# Patient Record
Sex: MALE | Race: OTHER | Employment: OTHER | URBAN - METROPOLITAN AREA
[De-identification: names, ages, dates, MRNs, and addresses within clinical notes are randomized per-mention and may not be internally consistent; named-entity substitution may affect disease eponyms.]

---

## 2017-02-02 ENCOUNTER — OFFICE VISIT (OUTPATIENT)
Dept: FAMILY MEDICINE CLINIC | Age: 42
End: 2017-02-02

## 2017-02-02 VITALS
DIASTOLIC BLOOD PRESSURE: 86 MMHG | WEIGHT: 200 LBS | HEART RATE: 57 BPM | TEMPERATURE: 98 F | SYSTOLIC BLOOD PRESSURE: 133 MMHG | HEIGHT: 69 IN | BODY MASS INDEX: 29.62 KG/M2

## 2017-02-02 DIAGNOSIS — Z23 ENCOUNTER FOR IMMUNIZATION: ICD-10-CM

## 2017-02-02 DIAGNOSIS — L08.9 RIGHT FOOT INFECTION: Primary | ICD-10-CM

## 2017-02-02 RX ORDER — CEPHALEXIN 500 MG/1
500 CAPSULE ORAL 4 TIMES DAILY
Qty: 40 CAP | Refills: 0 | Status: SHIPPED | OUTPATIENT
Start: 2017-02-02 | End: 2017-02-12

## 2017-02-02 NOTE — PATIENT INSTRUCTIONS
Picadura de araña o de escorpión: Instrucciones de cuidado - [ Spider Bite or Scorpion Sting: Care Instructions ]  Instrucciones de cuidado    Las picaduras de arañas o de escorpiones suelen causar inflamación, enrojecimiento, dolor y comezón leves. Estos síntomas leves son comunes y pueden durar desde unas horas hasta varios días. Algunas personas tienen [place] [employer] graves. El tratamiento en el hogar suele ser todo lo que necesita para [employer] síntomas. La atención de seguimiento es santana parte clave de wilkes tratamiento y seguridad. Asegúrese de hacer y acudir a todas las citas, y llame a wilkes médico si está teniendo problemas. También es santana buena idea saber los resultados de los exámenes y mantener santana lista de los medicamentos que shira. ¿Cómo puede cuidarse en el hogar? · Colóquese hielo o santana compresa fría sobre la rick de 10 a 20 minutos cada vez. Póngase un paño mason entre el hielo y la piel. · Pruebe un medicamento de venta lawrence para la comezón, el enrojecimiento, la hinchazón y el dolor. Sondra y siga todas las instrucciones de la [place]. ¨ [place] un antihistamínico, yael difenhidramina (Benadryl) o loratadina (Claritin). ¨ Póngase crema de hidrocortisona al 1% o loción de calamina en la piel. · No se rasque ni se frote la piel alrededor de la rick. ¿Cuándo debe pedir ayuda? Llame al 911 en cualquier momento que considere que necesita atención de emergencia. Por ejemplo, llame si:  · Se desmayó (perdió el conocimiento). · Tiene convulsiones. · Tiene dificultades para respirar. Llame a wilkes médico ahora mismo o busque atención médica inmediata si:  · Tiene señales de infección, tales yael:  ¨ Aumento del dolor, la hinchazón, el enrojecimiento o la temperatura alrededor de la picadura. ¨ Vetas rojizas que salen de la rick. ¨ Pus que supura de la rick. Arabella Mena. · Tiene santana ampolla o llaga en la rick de la picadura, o la rick se pone de color violáceo.   Preste especial atención a los cambios en wilkes courtney y asegúrese de comunicarse con wilkes médico si:  · Tiene dolor o ardor en la rick después de 2 días de tratamiento en el hogar. · Tiene síntomas darcy más de 1 semana. ¿Dónde puede encontrar más información en inglés? Elyse Kodak a http://estefania-jarod.info/. Escriba P595 en la búsqueda para aprender más acerca de \"Picadura de araña o de escorpión: Instrucciones de cuidado - [ Spider Bite or Scorpion Sting: Care Instructions ]. \"  Revisado: 27 li, 2016  Versión del contenido: 11.1  © 7915-9459 Healthwise, Incorporated. Las instrucciones de cuidado fueron adaptadas bajo licencia por [employer] (which disclaims liability or warranty for this information). Si usted tiene [place] afección médica o sobre estas instrucciones, siempre pregunte a wilkes profesional de courtney. [employer] niega toda garantía o responsabilidad por wilkes uso de esta información.

## 2017-02-02 NOTE — PROGRESS NOTES
Assessment/Plan:    Saleem was seen today for foot swelling. Diagnoses and all orders for this visit:    Right foot infection  -     cephALEXin (KEFLEX) 500 mg capsule; Take 1 Cap by mouth four (4) times daily for 10 days. Pt was instructed to elevate his right LE above heart level for 3 days, no work. He states that he can and does not need a work note- it is a [employer] service  Tetanus, flu shot  Close f/up  This is 3 weeks out and by his description, the swelling has improved, the redness has resolved and the pain has improved. Follow-up Disposition:  Return in about 2 weeks (around 2/16/2017). Jeanice Eisenmenger McFerren, PA-C  Saleem Elyse Yi expressed understanding of this plan. An AVS was printed and given to the patient.      ----------------------------------------------------------------------    Chief Complaint   Patient presents with    Foot Swelling     x 3 weeks       History of Present Illness:  47 yo generally healthy has no chronic medical conditions. He is here today due to a possible spider bite on his right foot. The sxs started about 3 weeks ago. He did not see the bite but had acute swelling and redness of his right hallux toe that spread over his whole dorsal foot. The foot has become dark in color and his toe is still painful especially on extension. The skin is sloughing off of his toe. He has not had a fever  He works in the [employer]. He wears work boots but notes that it would be possible for a spider to get into his boots        No past medical history on file. Current Outpatient Prescriptions   Medication Sig Dispense Refill    cephALEXin (KEFLEX) 500 mg capsule Take 1 Cap by mouth four (4) times daily for 10 days. 40 Cap 0       No Known Allergies    Social History   Substance Use Topics    Smoking status: Never Smoker    Smokeless tobacco: None    Alcohol use No       No family history on file.     Physical Exam:     Visit Vitals    BP 133/86 (BP 1 Location: Left arm, BP Patient Position: Sitting)    Pulse (!) 57    Temp 98 °F (36.7 °C) (Oral)    Ht 5' 8.7\" (1.745 m)    Wt 200 lb (90.7 kg)    BMI 29.79 kg/m2     Looks well, pleasant  A&Ox3  WDWN NAD  Respirations normal and non labored  Comparing right to left foot- the right foot has chronic varicose spider veins these are not new. The whole dorsum of his right foot is dark in color as compared to the lighter skin of his left foot. His right hallux has an area of new skin growth where there has been sloughing of old skin. He has tenderness with extension of his right hallux. There is no obvious bite steve but it has been 3 weeks. He has strong, brisk DP pulse. His skin color is cool at the digits on right foot but not cold. There is no swelling present of his right foot (which he states that there initially was).

## 2017-02-02 NOTE — MR AVS SNAPSHOT
Visit Information Melissa Whiteside Personal Médico Departamento Teléfono del Dep. Número de visita 2/2/2017  9:45 AM [address], FRANK Alvarado [phone] Follow-up Instructions Return in about 2 weeks (around 2/16/2017). Upcoming Health Maintenance Date Due DTaP/Tdap/Td series (1 - Tdap) 5/26/1998 INFLUENZA AGE 9 TO ADULT 8/1/2016 Alergias  Review Complete El: 2/2/2017 Por: Ariel Onorfe A partir del:  2/2/2017 No Known Allergies Vacunas actuales Sammy Maillard No hay ninguna vacuna archivada. No revisadas esta visita You Were Diagnosed With   
  
 Zahira Cerda Right foot infection    -  Primary ICD-10-CM: L08.9 ICD-9-CM: 176. 9 Partes vitales PS Pulso Temperatura [place] ( percentil de crecimiento) Peso (percentil de crecimiento) BMI ([facility]  
 133/86 (BP 1 Location: Left arm, BP Patient Position: Sitting) (!) 57 98 °F (36.7 °C) (Oral) 5' 8.7\" (1.745 m) 200 lb (90.7 kg) 29.79 kg/m2 Estatus de tabaquísmo Never Smoker Historial de signos vitales BMI and BSA Data Body Mass Index Body Surface Area  
 29.79 kg/m 2 2.1 m 2 Latonia Burciaga Pharmacy Name Phone [facility] [address] [place] lista de medicamentos actualizada Lista actualizada el: 2/2/17 10:47 AM.  Ellin Viola use santoro lista de medicamentos más reciente. cephALEXin 500 mg capsule También conocido yael:  Cliff De Anda Take 1 Cap by mouth four (4) times daily for 10 days. Recetas Enviado a la Marcia Refills  
 cephALEXin (KEFLEX) 500 mg capsule 0 Sig: Take 1 Cap by mouth four (4) times daily for 10 days. Class: Normal  
 Pharmacy: [address] Ph #: [phone] Route: Oral  
  
Instrucciones de seguimiento Return in about 2 weeks (around 2/16/2017). Instrucciones para el [place] Picadura de araña o de escorpión: Instrucciones de cuidado - [ Spider Bite or Scorpion Sting: Care Instructions ] Instrucciones de cuidado Las picaduras de arañas o de escorpiones suelen causar inflamación, enrojecimiento, dolor y comezón leves. Estos síntomas leves son comunes y pueden durar desde unas horas hasta varios días. Algunas personas tienen [place] [employer] graves. El tratamiento en el hospitals suele ser todo lo que necesita para [employer] síntomas. La atención de seguimiento es santana parte clave de wilkes tratamiento y seguridad. Asegúrese de hacer y acudir a todas las citas, y llame a wilkes médico si está teniendo problemas. También es santana buena idea saber los resultados de los exámenes y mantener santana lista de los medicamentos que shira. Cómo puede cuidarse en el hospitals? · Colóquese hielo o santana compresa fría sobre la rick de 10 a 20 minutos cada vez. Póngase un paño mason entre el hielo y la piel. · Pruebe un medicamento de venta lawrence para la comezón, el enrojecimiento, la hinchazón y el dolor. Sondra y siga todas las instrucciones de la [place]. ¨ [place] un antihistamínico, yael difenhidramina (Benadryl) o loratadina (Claritin). ¨ Póngase crema de hidrocortisona al 1% o loción de calamina en la piel. · No se rasque ni se frote la piel alrededor de la rick. Cuándo debe pedir ayuda? Llame al 911 en cualquier momento que considere que necesita atención de emergencia. Por ejemplo, llame si: 
· Se desmayó (perdió el conocimiento). · Tiene convulsiones. · Tiene dificultades para respirar. Llame a wilkes médico ahora mismo o busque atención médica inmediata si: · [address], tales yael: ¨ Aumento del dolor, la hinchazón, el enrojecimiento o la temperatura alrededor de la picadura. ¨ Vetas rojizas que salen de la rick. ¨ Pus que supura de la rick. Heather Stapbecca. · Tiene santana ampolla o llaga en la rick de la picadura, o la rick se pone de color violáceo. Preste especial atención a los cambios en wilkes courtney y asegúrese de comunicarse con wilkes médico si: · Tiene dolor o ardor en la rick después de 2 días de tratamiento en el hogar. · Tiene síntomas darcy más de 1 semana. Dónde puede encontrar más información en inglés? Susan Chou a http://estefania-jarod.info/. Escriba P595 en la búsqueda para aprender más acerca de \"Picadura de araña o de escorpión: Instrucciones de cuidado - [ Spider Bite or Scorpion Sting: Care Instructions ]. \" 
Revisado: 27 [place], 2016 Versión del contenido: 11.1 © 6087-0237 Healthwise, Incorporated. Las instrucciones de cuidado fueron adaptadas bajo licencia por [employer] (which disclaims liability or warranty for this information). Si usted tiene [place] afección médica o sobre estas instrucciones, siempre pregunte a wilkes profesional de courtney. Healthwise, Incorporated niega toda garantía o responsabilidad por wilkes uso de esta información. Introducing [facility] & HEALTH SERVICES! [facility] introduce portal paciente MyChart . Ahora se puede acceder a partes de wilkes expediente médico, enviar por correo electrónico la oficina de wilkes médico y solicitar renovaciones de medicamentos en línea. En wilkes navegador de Internet , Erasmo López a [email or web address] Amy maykel en el usuario por Rajat Bruno? Keri brar aquí en la sesión Martita Loja. Verá la página de registro [place]. Ingrese wilkes código de [facility] Sejal shannon y yael aparece a continuación. [place] no tendrá que [employer] código después de lisa completado el proceso de registro . Si usted no se inscribe antes de la fecha de caducidad , debe solicitar un nuevo código. · MyChart Código de acceso : [id number] Expires: 5/3/2017 10:47 AM 
 
Ingresa los últimos cuatro dígitos de wilkes Número de Seguro Social ( xxxx ) y fecha de nacimiento ( dd / mm / aaaa ) yael se indica y amy clic en Enviar. Usted será llevado a la siguiente página de registro . Crear un ID MyChart . Esta será wilkes ID de inicio de sesión de MyChart y no puede ser [place] , por lo que pensar en santana que es Madlyn Manners y fácil de recordar . Crear santana contraseña MyChart . Usted puede cambiar wilkes contraseña en cualquier momento . Ingrese wilkes Password Reset de preguntas y [place] . [place] se puede utilizar en un momento posterior si usted olvida wilkes contraseña. Introduzca wilkes dirección de correo electrónico . Debbe Cancel recibirá santana notificación por correo electrónico cuando la nueva información está disponible en MyChart . Ronan Hai clic en Registrarse. Merl Clutter robbi y descargar porciones de wilkes expediente médico. 
Amy clic en el enlace de descarga del menú Resumen para descargar santana copia portátil de wilkes información médica . Si tiene [employer] , por favor visite la sección de preguntas frecuentes del sitio web MyChart . Recuerde, MyChart NO es que se utilizará para las necesidades urgentes. Para emergencias médicas , llame al 911 . Ahora disponible en wilkes iPhone y Android ! Por favor proporcione ryan resumen de la documentación de cuidado a wilkes próximo proveedor. If you have any questions after today's visit, please call [phone].

## 2017-02-02 NOTE — PROGRESS NOTES
Printed AVS, provided to pt and reviewed. Pt indicated understanding and had no questions. Told pt that rx's have been sent to pharmacy and they should be ready for  in approximately 2 hrs. Requests flu and Tdap vaccine; denies fever, egg allergy. Immunization given per protocol and recorded in [address]. VIS information sheet given, explained possible S/E. Reviewed sx indicating need to be seen in ER. Pt had no adverse reaction at time of discharge. All medications reviewed with the pt. The pt was told to elevate his RLE as much as possible higher than his heart for 3 days. He may go back to work after 3 days on Mon. If foot is worse or no better ( increased redness, swelling, pain, fever and chills, drainage) he needed to go to the ED. The  was Dave Brown.  Pari Garner RN

## 2017-02-24 ENCOUNTER — OFFICE VISIT (OUTPATIENT)
Dept: FAMILY MEDICINE CLINIC | Age: 42
End: 2017-02-24

## 2017-02-24 VITALS
TEMPERATURE: 98.2 F | WEIGHT: 201 LBS | BODY MASS INDEX: 29.94 KG/M2 | DIASTOLIC BLOOD PRESSURE: 76 MMHG | HEART RATE: 93 BPM | SYSTOLIC BLOOD PRESSURE: 112 MMHG

## 2017-02-24 DIAGNOSIS — L08.9 RIGHT FOOT INFECTION: Primary | ICD-10-CM

## 2017-02-24 RX ORDER — IBUPROFEN 600 MG/1
600 TABLET ORAL
Qty: 60 TAB | Refills: 0 | Status: SHIPPED | OUTPATIENT
Start: 2017-02-24

## 2017-02-24 NOTE — MR AVS SNAPSHOT
Visit Information Carol Ann Horner Personal Médico Departamento Teléfono del Dep. Número de visita 2/24/2017  2:00 PM Willaim FRANK [place] [address] Davison [phone] Follow-up Instructions Return if symptoms worsen or fail to improve. Upcoming Health Maintenance Date Due DTaP/Tdap/Td series (2 - Td) 2/2/2027 Alerg[place]  Review Complete El: 2/2/2017 Por: Thierry Conakiko A partir del:  2/24/2017 No Known Allergies Vacunas actuales [place] [facility] Ice Influenza Vaccine (Quad) PF 2/2/2017 Tdap 2/2/2017 No revisadas esta visita You Were Diagnosed With   
  
 Salo Claudio Right foot infection    -  Primary ICD-10-CM: L08.9 ICD-9-CM: 681. 9 Partes vitales PS  
  
  
  
  
  
 112/76 (BP 1 Location: Right arm, BP Patient Position: Sitting) Historial de signos vitales BMI and BSA Data Body Mass Index Body Surface Area  
 29.94 kg/m 2 2.1 m 2 Kwasi Rape Pharmacy Name Phone [facility] [address] [place] lista de medicamentos actualizada Lista actualizada el: 2/24/17  2:12 PM.  Alfredozach Jensen use santoro lista de medicamentos más reciente. ibuprofen 600 mg tablet También conocido yael:  MOTRIN Take 1 Tab by mouth every six (6) hours as needed for Pain. Recetas Enviado a la [place] Refills  
 ibuprofen (MOTRIN) 600 mg tablet 0 Sig: Take 1 Tab by mouth every six (6) hours as needed for Pain. Class: Normal  
 Pharmacy: [address] Ph #: [phone] Route: Oral  
  
Instrucciones de seguimiento Return if symptoms worsen or fail to improve. Introducing [facility] & HEALTH SERVICES! [facility] introduce portal paciente MyChart .  Ahora se puede acceder a partes de wilkes expediente médico, enviar por correo electrónico la oficina de wilkes médico y solicitar renovaciones de medicamentos en línea. En wilkes navegador de Internet , Yady espinosa [email or web address] Donaldo clic en el usuario por [place]? Marcimadhavikera [place] clic aquí en la sesión Raudel Roche. Verá la página de registro [place]. Ingrese wilkes código de [facility] of Sejal shannon y yael aparece a continuación. Usted no tendrá que [employer] código después de lisa completado el proceso de registro . Si usted no se inscribe antes de la fecha de caducidad , debe solicitar un nuevo código. · MyChart Código de acceso : [id number] Expires: 5/3/2017 10:47 AM 
 
Ingresa los últimos cuatro dígitos de wilkes Número de Seguro Social ( xxxx ) y fecha de nacimiento ( dd / mm / aaaa ) yael se indica y donaldo clic en Enviar. Usted será llevado a la siguiente página de registro . Crear un ID MyChart . Esta será wilkes ID de inicio de sesión de MyChart y no puede ser [place] , por lo que pensar en santana que es Salena Drones y fácil de recordar . Crear santana contraseña MyChart . Usted puede cambiar wilkes contraseña en cualquier momento . Ingrese wilkes Password Reset de preguntas y [place] . [place] se puede utilizar en un momento posterior si usted olvida wilkes contraseña. Introduzca wilkes dirección de correo electrónico . Raya Lyman recibirá santana notificación por correo electrónico cuando la nueva información está disponible en MyChart . Odella Credit clic en Registrarse. Rusty Mohini robbi y descargar porciones de wilkes expediente médico. 
Donaldo clic en el enlace de descarga del menú Resumen para descargar santana copia portátil de wilkes información médica . Si tiene [employer] , por favor visite la sección de preguntas frecuentes del sitio web MyChart . Recuerde, MyChart NO es que se utilizará para las necesidades urgentes. Para emergencias médicas , llame al 911 . Ahora disponible en wilkes iPhone y Android ! Por favor proporcione ryan resumen de la documentación de cuidado a wilkes próximo proveedor. If you have any questions after today's visit, please call [phone].

## 2017-02-24 NOTE — PROGRESS NOTES
Coordination of Care  1. Have you been to the ER, urgent care clinic since your last visit? Hospitalized since your last visit? No    2. Have you seen or consulted any other health care providers outside of the [address] [place] [address] since your last visit? Include any pap smears or colon screening. No    Medications  Medication Reconciliation Performed: yes  Patient does not know need refills     Learning Assessment Complete?  yes

## 2017-02-24 NOTE — PROGRESS NOTES
Assessment/Plan:  Right foot infection has healed. The color has returned to normal in his foot, the swelling is gone. He continues to have a little pain under medial arch of his foot and for that I have recommended rolling his foot on frozen water bottle. He was instructed to return to clinic or seek ER care for the following: fever, increase in pain, new swelling or other concern    Follow-up Disposition: Not on File    [facility] expressed understanding of this plan. An AVS was printed and given to the patient.      ----------------------------------------------------------------------    Chief Complaint   Patient presents with    Foot Pain     Right foot pain recheck       History of Present Illness:    Here for recheck on his right foot infection. The skin has healed, the swelling is gone, the color has returned to normal in his foot. Residual mild pain right medial instep. Wearing flip flops today so I have suggested that he wear supportive shoes    No past medical history on file. No Known Allergies    Social History   Substance Use Topics    Smoking status: Never Smoker    Smokeless tobacco: None    Alcohol use No       No family history on file.     Physical Exam:     Visit Vitals    /76 (BP 1 Location: Right arm, BP Patient Position: Sitting)    Pulse 93    Temp 98.2 °F (36.8 °C) (Oral)    Wt 201 lb (91.2 kg)    BMI 29.94 kg/m2       A&Ox3  WDWN NAD  Respirations normal and non labored

## 2018-05-01 ENCOUNTER — APPOINTMENT (OUTPATIENT)
Dept: CT IMAGING | Age: 43
End: 2018-05-01
Attending: PHYSICIAN ASSISTANT
Payer: SELF-PAY

## 2018-05-01 ENCOUNTER — HOSPITAL ENCOUNTER (EMERGENCY)
Age: 43
Discharge: HOME | End: 2018-05-01
Attending: EMERGENCY MEDICINE
Payer: SELF-PAY

## 2018-05-01 VITALS
OXYGEN SATURATION: 97 % | HEART RATE: 78 BPM | WEIGHT: 200 LBS | RESPIRATION RATE: 16 BRPM | HEIGHT: 70 IN | BODY MASS INDEX: 28.63 KG/M2 | TEMPERATURE: 98.5 F | SYSTOLIC BLOOD PRESSURE: 139 MMHG | DIASTOLIC BLOOD PRESSURE: 79 MMHG

## 2018-05-01 DIAGNOSIS — S05.01XA ABRASION OF RIGHT CORNEA, INITIAL ENCOUNTER: ICD-10-CM

## 2018-05-01 DIAGNOSIS — S01.81XA FACIAL LACERATION, INITIAL ENCOUNTER: Primary | ICD-10-CM

## 2018-05-01 DIAGNOSIS — S02.2XXA CLOSED FRACTURE OF NASAL BONE, INITIAL ENCOUNTER: ICD-10-CM

## 2018-05-01 LAB — ERYTHROCYTE [SEDIMENTATION RATE] IN BLOOD: 6 MM/HR (ref 0–15)

## 2018-05-01 PROCEDURE — 70450 CT HEAD/BRAIN W/O DYE: CPT

## 2018-05-01 PROCEDURE — 77030031139 HC SUT VCRL2 J&J -A

## 2018-05-01 PROCEDURE — 75810000294 HC INTERM/LAYERED WND RPR

## 2018-05-01 PROCEDURE — 99285 EMERGENCY DEPT VISIT HI MDM: CPT

## 2018-05-01 PROCEDURE — 74011250637 HC RX REV CODE- 250/637: Performed by: PHYSICIAN ASSISTANT

## 2018-05-01 PROCEDURE — 77030002916 HC SUT ETHLN J&J -A

## 2018-05-01 PROCEDURE — 74011250636 HC RX REV CODE- 250/636: Performed by: PHYSICIAN ASSISTANT

## 2018-05-01 PROCEDURE — 74011000250 HC RX REV CODE- 250: Performed by: PHYSICIAN ASSISTANT

## 2018-05-01 PROCEDURE — 90471 IMMUNIZATION ADMIN: CPT

## 2018-05-01 PROCEDURE — 90715 TDAP VACCINE 7 YRS/> IM: CPT | Performed by: PHYSICIAN ASSISTANT

## 2018-05-01 PROCEDURE — 36415 COLL VENOUS BLD VENIPUNCTURE: CPT | Performed by: PHYSICIAN ASSISTANT

## 2018-05-01 PROCEDURE — 77030018836 HC SOL IRR NACL ICUM -A

## 2018-05-01 PROCEDURE — 85652 RBC SED RATE AUTOMATED: CPT | Performed by: PHYSICIAN ASSISTANT

## 2018-05-01 PROCEDURE — 70486 CT MAXILLOFACIAL W/O DYE: CPT

## 2018-05-01 RX ORDER — ERYTHROMYCIN 5 MG/G
OINTMENT OPHTHALMIC
Qty: 1 G | Refills: 0 | Status: SHIPPED | OUTPATIENT
Start: 2018-05-01 | End: 2018-05-08

## 2018-05-01 RX ORDER — TRAMADOL HYDROCHLORIDE 50 MG/1
50 TABLET ORAL
Status: COMPLETED | OUTPATIENT
Start: 2018-05-01 | End: 2018-05-01

## 2018-05-01 RX ORDER — TETRACAINE HYDROCHLORIDE 5 MG/ML
1 SOLUTION OPHTHALMIC
Status: COMPLETED | OUTPATIENT
Start: 2018-05-01 | End: 2018-05-01

## 2018-05-01 RX ORDER — BUTALBITAL, ACETAMINOPHEN AND CAFFEINE 300; 40; 50 MG/1; MG/1; MG/1
1 CAPSULE ORAL
Qty: 20 CAP | Refills: 0 | Status: SHIPPED | OUTPATIENT
Start: 2018-05-01

## 2018-05-01 RX ADMIN — TETRACAINE HYDROCHLORIDE 1 DROP: 5 SOLUTION OPHTHALMIC at 17:45

## 2018-05-01 RX ADMIN — TETANUS TOXOID, REDUCED DIPHTHERIA TOXOID AND ACELLULAR PERTUSSIS VACCINE, ADSORBED 0.5 ML: 5; 2.5; 8; 8; 2.5 SUSPENSION INTRAMUSCULAR at 17:46

## 2018-05-01 RX ADMIN — Medication 2 ML: at 16:57

## 2018-05-01 RX ADMIN — TRAMADOL HYDROCHLORIDE 50 MG: 50 TABLET, FILM COATED ORAL at 18:52

## 2018-05-01 RX ADMIN — FLUORESCEIN SODIUM 2 STRIP: 0.6 STRIP OPHTHALMIC at 16:20

## 2018-05-01 NOTE — ED PROVIDER NOTES
HPI Comments: Bonifacio Cooper is a 38 y.o. male  who presents by private vehicle to ER with c/o Patient presents with:  Head Injury  Laceration  Patient was working with a bobcat, pulling a log with a strap when the strap broke flew back hitting him in the face. Patient with laceration to right side of forehead, patient also with superficial laceration to bridge of nose and erythema to right eye. Patient denies LOC, nausea or vomiting. Denies neck or back pain. Patient reports vision in right eye is blurry. He specifically denies any fevers, chills, nausea, vomiting, chest pain, shortness of breath, headache, rash, diarrhea, abdominal pain, urinary/bowel changes, sweating or weight loss. PCP: Andrew Batista MD   PMHx significant for: No past medical history on file. PSHx significant for: No past surgical history on file. Social Hx: Tobacco use: Smoking status: Not on file                        Smokeless status: Not on file                     ; EtOH use: The patient states he drinks 0 per week.; Illicit Drug use: Allergies: Allergies not on file    There are no other complaints, changes or physical findings at this time. Patient is a 38 y.o. male presenting with head injury and skin laceration. The history is provided by the patient. Head Injury    The incident occurred 1 to 2 hours ago. He came to the ER via walk-in. The injury mechanism was a direct blow and a laceration. The volume of blood lost was minimal. The quality of the pain is described as sharp. The pain is at a severity of 6/10. The pain is mild. The pain has been constant since the injury. Associated symptoms include blurred vision. Pertinent negatives include no vomiting, no tinnitus, no disorientation, no weakness and no memory loss. He has tried applying pressure and NSAID for the symptoms. There was no loss of consciousness. He has been behaving normally. It is unknown when the patient last had a tetanus shot.    Laceration Pertinent negatives include no weakness. No past medical history on file. No past surgical history on file. No family history on file. Social History     Social History    Marital status:      Spouse name: N/A    Number of children: N/A    Years of education: N/A     Occupational History    Not on file. Social History Main Topics    Smoking status: Not on file    Smokeless tobacco: Not on file    Alcohol use Not on file    Drug use: Not on file    Sexual activity: Not on file     Other Topics Concern    Not on file     Social History Narrative         ALLERGIES: Review of patient's allergies indicates not on file. Review of Systems   Constitutional: Negative. HENT: Negative. Negative for tinnitus. Eyes: Positive for blurred vision and pain. Respiratory: Negative. Cardiovascular: Negative. Gastrointestinal: Negative. Negative for vomiting. Endocrine: Negative. Genitourinary: Negative. Musculoskeletal: Negative. Skin: Positive for wound. Allergic/Immunologic: Negative. Neurological: Negative. Negative for weakness. Hematological: Negative. Psychiatric/Behavioral: Negative. Negative for memory loss. All other systems reviewed and are negative. Vitals:    05/01/18 1605   BP: 130/79   Pulse: 78   Resp: 16   Temp: 98.5 °F (36.9 °C)   SpO2: 99%   Weight: 90.7 kg (200 lb)   Height: 5' 10\" (1.778 m)            Physical Exam   Constitutional: He is oriented to person, place, and time. He appears well-developed and well-nourished. Non-toxic appearance. He does not have a sickly appearance. He does not appear ill. No distress. HENT:   Head: Normocephalic. Right Ear: External ear normal.   Left Ear: External ear normal.   Nose:       Mouth/Throat: Oropharynx is clear and moist. No oropharyngeal exudate. Eyes: EOM and lids are normal. Pupils are equal, round, and reactive to light. Right eye exhibits no discharge.  Left eye exhibits no discharge. Right conjunctiva is injected. Right conjunctiva has no hemorrhage. Left conjunctiva is not injected. Left conjunctiva has no hemorrhage. No scleral icterus. Right eye exhibits normal extraocular motion and no nystagmus. Left eye exhibits normal extraocular motion and no nystagmus. Right pupil is round and reactive. Left pupil is round and reactive. Pupils are equal.   Slit lamp exam:       The right eye shows corneal abrasion and fluorescein uptake. The right eye shows no corneal flare, no corneal ulcer, no foreign body, no hyphema, no hypopyon and no anterior chamber bulge. Neck: Normal range of motion. Neck supple. No tracheal deviation present. No thyromegaly present. Cardiovascular: Normal rate, regular rhythm, normal heart sounds and intact distal pulses. No murmur heard. Pulmonary/Chest: Effort normal and breath sounds normal. No respiratory distress. He has no wheezes. He has no rales. Abdominal: Soft. Bowel sounds are normal. He exhibits no distension. There is no tenderness. There is no rebound and no guarding. Musculoskeletal: Normal range of motion. He exhibits no edema or tenderness. Spine palpated with out step off or crepitus. Non-TTP over spine. Full ROM to all extremities. All extremities are NVI. Patient ambulatory to exam room with out difficulty. Lymphadenopathy:     He has no cervical adenopathy. Neurological: He is alert and oriented to person, place, and time. He has normal strength. No cranial nerve deficit or sensory deficit. He displays a negative Romberg sign. Coordination normal. GCS eye subscore is 4. GCS verbal subscore is 5. GCS motor subscore is 6. Skin: Skin is warm. No rash noted. No erythema. Psychiatric: He has a normal mood and affect. His behavior is normal. Judgment and thought content normal.   Nursing note and vitals reviewed.        MDM  Number of Diagnoses or Management Options  Abrasion of right cornea, initial encounter: Closed fracture of nasal bone, initial encounter:   Facial laceration, initial encounter:   Diagnosis management comments: Assesment/Plan- 38 y.o. Patient presents with:  Head Injury  Laceration  differential includes: head injury, traumatic brain injury, laceration, nasal fracture. Labs and imaging reviewed with normal head ct. Ct scan of facial bone showing nasal fracture . Recommend ophthalmology, PCP follow up. Patient educated on reasons to return to the ED. Amount and/or Complexity of Data Reviewed  Clinical lab tests: reviewed and ordered  Tests in the radiology section of CPT®: ordered and reviewed  Tests in the medicine section of CPT®: ordered and reviewed  Discuss the patient with other providers: yes (Attending- Dr. Luis Enrique Lyles who also saw patient and agrees with plan)          ED Course       Wound Repair  Date/Time: 5/1/2018 7:27 PM  Performed by: [employer] provider: Sravan  Preparation: skin prepped with Shur-Clens  Pre-procedure re-eval: Immediately prior to the procedure, the patient was reevaluated and found suitable for the planned procedure and any planned medications. Time out: Immediately prior to the procedure a time out was called to verify the correct patient, procedure, equipment, staff and marking as appropriate. .  Location details: face  Wound length:2.6 - 7.5 cm  Anesthesia: local infiltration    Anesthesia:  Local Anesthetic: LET (lido,epi,tetracaine)  Anesthetic total: 2 mL  Foreign bodies: no foreign bodies  Irrigation solution: saline  Irrigation method: syringe  Debridement: none  Skin closure: 6-0 nylon  Subcutaneous closure: Vicryl  Number of sutures: 17  Technique: simple  Approximation: close  Patient tolerance: Patient tolerated the procedure well with no immediate complications  My total time at bedside, performing this procedure was 31-45 minutes.

## 2018-05-01 NOTE — ED TRIAGE NOTES
\"I was working on a bobcat and I was pulling a log with a strap on it. The strap broke and it flew up and hit me in the face. It had a piece of metal on it. \"  Patient has deep laceration to right forehead which is covered in gauze on arrival, bleeding controlled. Small superficial laceration to nasal bridge. Patient denies LOC. He says he thinks his vision is intact, +swelling around right eye.

## 2018-05-01 NOTE — DISCHARGE INSTRUCTIONS
Mejia: Instrucciones de cuidado - [ Cuts: Care Instructions ]  Instrucciones de cuidado  Un hoang puede ocurrir en cualquier parte del cuerpo. A veces, se usan puntos de sutura, grapas, vozero para la piel o cintas Jaama 45 llamadas Steri-Strips para mantener juntos los bordes de un hoang y ayudar a que sane. Las cintas Steri-Strip pueden usarse por sí solas o junto con puntos de sutura o grapas. Otras veces, se willa los mejia abiertos. Si el hoang es profundo y [employer], es posible que el médico haya cerrado el hoang en dos capas. Santana capa más profunda de puntos de sutura junta la parte profunda del hoang. Estos puntos se disuelven y no es necesario extraerlos. El cierre de la capa superior, que puede [place] por medio de puntos, Robert, Steri-Strips o vozero, es lo que se puede robbi sobre el hoang. Con frecuencia, un hoang se cubre con santana venda. El médico lo ha examinado minuciosamente, john pueden presentarse problemas más tarde. Si nota algún problema o nuevos síntomas, busque tratamiento médico de inmediato. La atención de seguimiento es santana parte clave de santoro tratamiento y seguridad. Asegúrese de hacer y acudir a todas las citas, y llame a santoro médico si está teniendo problemas. También es santana buena idea saber los resultados de los exámenes y mantener santana lista de los medicamentos que shira. ¿Cómo puede cuidarse en el hogar? Si un hoang está abierto o cerrado  · Apoye la rick afectada sobre santana almohada en cualquier momento que esté sentado o acostado darcy los 3 días siguientes. Trate de mantenerla por encima del nivel del corazón. Jessie ayudará a reducir la hinchazón. · Mantenga la herida seca darcy las primeras 24 a 48 horas. Después de [employer], puede ducharse si el médico lo [place]. Seque el hoang con toques suaves de toalla. · No remoje el hoang, yael en santana vicente (bañera). Santoro médico le indicará cuándo es seguro mojar el hoang.   · Después de las primeras 24 a 48 horas, limpie el hoang con agua y jabón 2 veces al día, a menos que el médico le dé indicaciones diferentes. ¨ No use peróxido de hidrógeno (agua [place]) ni alcohol, los cuales pueden retrasar la sanación. ¨ Puede cubrir el hoang con santana capa delgada de vaselina y santana venda antiadherente. ¨ Si el médico colocó santana venda sobre el hoang, colóquese santana venda nueva después de limpiar el hoang o si la venda se moja o se ensucia. · Evite cualquier actividad que pudiera hacer que el hoang se jose de nuevo. · Sea gutierrez con los medicamentos. Sondra y siga todas las indicaciones de la [place]. ¨ Si el médico le recetó un analgésico (medicamento para el dolor), tómelo según las indicaciones. ¨ Si no está tomando un analgésico recetado, pregúntele a wilkes médico si puede brian carlitos de [employer]. Si se cerró el hoang con puntos, grapas o Steri-Strips  · Siga las instrucciones anteriores para los mejia abiertos o cerrados. · No se quite los puntos o las grapas por sí mismo. El médico le indicará cuándo debe volver para que le quiten los [address] grapas. · Deje puestas las Steri-Strips hasta que se desprendan por sí solas. Si se cerró el hoang con un adhesivo para la piel  · Siga las instrucciones anteriores para los mejia abiertos o cerrados. · Déjese puesto el [employer] sobre la piel hasta que se desprenda por sí solo. [place] puede tardar entre 5 y 7 días. · No se rasque, frote ni hurgue el [employer]. · No se aplique la parte pegajosa de santana venda directamente sobre el [employer]. · No se aplique ningún tipo de pomada, crema o loción sobre la rick. [place] puede hacer que el [employer] se desprenda demasiado pronto. No use peróxido de hidrógeno (agua [place]) ni alcohol, los cuales pueden retrasar la sanación. ¿Cuándo debes pedir ayuda? Llama a tu médico ahora mismo o busca atención médica inmediata si:  ? · Tienes dolor nuevo, o el dolor [employer]. ? · La piel cerca del hoang está fría o pálida, o cambia de color. ? · Sientes hormigueo, debilitamiento o entumecimiento cerca del hoang. ? · El hoang comienza a sangrar, y la donald empapa la venda. Es normal que salga santana pequeña cantidad de [place]. ? · Tienes dificultades para  la rick cercana al hoang. ? · Tienes síntomas de infección, tales yael:  ¨ Aumento del dolor, la hinchazón, el enrojecimiento o la temperatura alrededor del hoang. ¨ Vetas rojizas que comienzan en el hoang. ¨ Pus que sale del hoang. Parker Diana. ?Presta especial atención a los cambios en tu courtney y asegúrate de comunicarte con tu médico si:  ? · El hoang vuelve a abrirse. ? · No mejoras yael se esperaba. ¿Dónde puede encontrar más información en inglés? Nicolas Koch a http://estefania-jarod.info/. Alexandra Mayo [id number] en la búsqueda para aprender más acerca de \"[place]m: Instrucciones de cuidado - [ Cuts: Care Instructions ]. \"  Revisado: 20 Ayala Francois 2017  Versión del contenido: 11.4  © 8974-5053 Healthwise, Incorporated. Las instrucciones de cuidado fueron adaptadas bajo licencia por [employer] (which disclaims liability or warranty for this information). Si usted tiene [place] afección médica o sobre estas instrucciones, siempre pregunte a wilkes profesional de courtney. Healthwise, Incorporated niega toda garantía o responsabilidad por wilkes uso de esta información. Andrea Lorenzo de nariz: Instrucciones de cuidado - [ Broken Nose: Care Instructions ]  Instrucciones de [address] o fractura de la nariz es la ruptura del hueso o Corinne S[place]. La mayoría de las fracturas de la nariz solo necesitan cuidado en el hogar y Tut[place] mare de seguimiento con un médico. La hinchazón debería reducirse en pocos días. Los moretones alrededor [employer] ojos y la nariz deberían desaparecer en 2 a 3 semanas. Usted sanará mejor si cuida kb de sí mismo. Coma santana variedad de alimentos saludables y no fume. La atención de seguimiento es santana parte clave de wilkes tratamiento y seguridad. Asegúrese de hacer y acudir a todas las citas, y llame a wilkes médico si está teniendo problemas. También es santana buena idea saber los resultados de los exámenes y mantener santana lista de los medicamentos que shira. ¿Cómo puede cuidarse en el hogar? · Si tiene santana tablilla (férula) o relleno nasal, déjela en wilkes lugar hasta que wilkes médico la retire. · Si wilkes médico le recetó antibióticos, tómelos según las indicaciones. No deje de tomarlos por el hecho de sentirse mejor. Debe brian todos los antibióticos hasta terminarlos. · [place] descongestionantes yael se le haya indicado para ayudarle a respirar después de que le retiren la tablilla o el relleno nasal. Wilkes médico puede darle santana receta o sugerirle un medicamento de [place]. · Sea gutierrez con los medicamentos. [place] los analgésicos (medicamentos para el dolor) exactamente según las indicaciones. ¨ Si el médico le recetó un analgésico, tómelo según las indicaciones. ¨ Si no está tomando un analgésico recetado, pregúntele a wilkes médico si puede brian carlitos de [place]. · Aplíquese hielo o santana compresa fría en la nariz darcy 10 a 20 minutos cada vez. Trate de hacerlo cada 1 a 2 horas darcy los 3 días siguientes (cuando esté despierto) o hasta que la hinchazón baje. Póngase un paño mason entre el hielo y la piel. · Duerma con la jose angel un poco elevada hasta que la hinchazón se reduzca. Eleve la jose angel y los hombros sobre almohadas. · No practique deportes de contacto darcy 6 semanas. ¿Cuándo debe pedir ayuda? Llame a wilkes médico ahora mismo o busque atención médica inmediata si:  ? · Tiene fiebre o dolor de jose angel intenso. ? · Tiene santana hemorragia nasal que no se detiene después de [employer] fosas nasales con los dedos 3 veces darcy 10 minutos cada vez (30 minutos en total). ? · La donald fluye hacia wilkes garganta aun después de presionar la nariz para cerrarla.    ? · Tiene signos de infección, tales yael:  ¨ Aumento del dolor, la hinchazón, el enrojecimiento o la temperatura. ¨ Vetas rojizas que salen de la herida. ¨ Pus que supura de la nariz. Luis Debar. ? · Tiene náuseas y vómito, se siente confuso o tiene dificultades para permanecer despierto. ?Preste especial atención a los cambios en wilkes courtney y asegúrese de comunicarse con wilkes médico si:  ? · Le duele la nariz aun después de brian analgésicos. ? · No puede respirar por la nariz después de que la hinchazón se reduce. ? · No mejora yael se esperaba. ¿Dónde puede encontrar más información en inglés? Nancy End a http://estefania-jarod.info/. Escriba [id number] en la búsqueda para aprender más acerca de \"Fractura de nariz: Instrucciones de cuidado - [ Broken Nose: Care Instructions ]. \"  Revisado: 21 marzo, 2017  Versión del contenido: 11.4  © 9045-0424 Healthwise, Incorporated. Las instrucciones de cuidado fueron adaptadas bajo licencia por [employer] (which disclaims liability or warranty for this information). Si usted tiene [place] afección médica o sobre estas instrucciones, siempre pregunte a wilkes profesional de courtney. Healthwise, Incorporated niega toda garantía o responsabilidad por wilkes uso de esta información. We hope that we have addressed all of your medical concerns. The examination and treatment you received in the Emergency Department were for an emergent problem and were not intended as complete care. It is important that you follow up with your healthcare provider(s) for ongoing care. If your symptoms worsen or do not improve as expected, and you are unable to reach your usual health care provider(s), you should return to the Emergency Department. Today's healthcare is undergoing tremendous change, and patient satisfaction surveys are one of the many tools to assess the quality of medical care. You may receive a survey from the [employer] organization regarding your experience in the Emergency Department.   I hope that your experience has been completely positive, particularly the medical care that I provided. As such, please participate in the survey; anything less than excellent does not meet my expectations or intentions. [facility] and [facility] participate in nationally recognized quality of care measures. If your blood pressure is greater than 120/80, as reported below, we urge that you seek medical care to address the potential of high blood pressure, commonly known as hypertension. Hypertension can be hereditary or can be caused by certain medical conditions, pain, stress, or \"white coat syndrome. \"       Please make an appointment with your health care provider(s) for follow up of your Emergency Department visit. VITALS:   Patient Vitals for the past 8 hrs:   Temp Pulse Resp BP SpO2   05/01/18 1856 - - - - 97 %   05/01/18 1845 - - - 139/79 96 %   05/01/18 1834 - - - - 96 %   05/01/18 1828 - - - - 95 %   05/01/18 1815 - - - 138/80 94 %   05/01/18 1800 - - - 143/81 95 %   05/01/18 1759 - - - - 92 %   05/01/18 1745 - - - 135/67 94 %   05/01/18 1730 - - - 140/71 92 %   05/01/18 1605 98.5 °F (36.9 °C) 78 16 130/79 99 %          Thank you for allowing us to provide you with medical care today. We realize that you have many choices for your emergency care needs. Please choose us in the future for any continued health care needs. Wallene Leyden Trice, [facility].   Office: [phone]            Recent Results (from the past 24 hour(s))   SED RATE (ESR)    Collection Time: 05/01/18  4:48 PM   Result Value Ref Range    Sed rate, automated 6 0 - 15 mm/hr       Ct Head Wo Cont    Result Date: 5/1/2018  EXAM:  CT HEAD WO CONT INDICATION:   head injury COMPARISON: None. CONTRAST:  None. TECHNIQUE: Unenhanced CT of the head was performed using 5 mm images. Brain and bone windows were generated.   CT dose reduction was achieved through use of a standardized protocol tailored for this examination and automatic exposure control for dose modulation. FINDINGS: The ventricles and sulci are normal in size, shape and configuration and midline. There is no significant white matter disease. There is no intracranial hemorrhage, extra-axial collection, mass, mass effect or midline shift. The basilar cisterns are open. No acute infarct is identified. The bone windows demonstrate no abnormalities. The visualized portions of the paranasal sinuses and mastoid air cells are clear. There is a laceration overlying right supraorbital region. .     IMPRESSION: No acute intracranial process is identified. Ct Maxillofacial Wo Cont    Result Date: 5/1/2018  EXAM:  CT MAXILLOFACIAL WO CONT INDICATION:   facial pain COMPARISON:  None. CONTRAST:   None. TECHNIQUE:  Multislice helical CT of the facial bones was performed in the axial plane without intravenous contrast administration. Coronal and sagittal reformations were generated. CT dose reduction was achieved through use of a standardized protocol tailored for this examination and automatic exposure control for dose modulation. FINDINGS: There is a right nasal bone fracture with adjacent soft tissue swelling. No laceration with soft tissue swelling right supraorbital region. . There is slight mucosal thickening left maxillary sinus and left frontal sinus. . The globes, optic nerves and extraocular muscles are normal. No abnormalities are identified within the visualized portions of the brain or nasopharynx. IMPRESSION: There is a fracture of the right nasal bone with adjacent soft tissue swelling.

## 2018-05-09 ENCOUNTER — HOSPITAL ENCOUNTER (EMERGENCY)
Age: 43
Discharge: HOME | End: 2018-05-09
Attending: EMERGENCY MEDICINE
Payer: SELF-PAY

## 2018-05-09 VITALS
OXYGEN SATURATION: 100 % | HEART RATE: 71 BPM | RESPIRATION RATE: 18 BRPM | DIASTOLIC BLOOD PRESSURE: 81 MMHG | TEMPERATURE: 98.4 F | WEIGHT: 200 LBS | SYSTOLIC BLOOD PRESSURE: 144 MMHG | BODY MASS INDEX: 29.62 KG/M2 | HEIGHT: 69 IN

## 2018-05-09 DIAGNOSIS — Z48.02 VISIT FOR SUTURE REMOVAL: Primary | ICD-10-CM

## 2018-05-09 PROCEDURE — 75810000275 HC EMERGENCY DEPT VISIT NO LEVEL OF CARE

## 2018-05-09 NOTE — DISCHARGE INSTRUCTIONS
Aprenda acerca de la retirada de puntos de sutura y grapas - [ Peter Jacinto and Alexandria Removal ]  ¿Cuándo se retiran los puntos de sutura y las grapas? Wilkes médico le indicará cuándo deben [employer] puntos de sutura o las grapas, por lo general al cabo de 7 a 14 sukhwinder. El tiempo que tenga que esperar dependerá de cosas yael la ubicación de la herida, el tamaño y la profundidad de la herida y wilkes estado de courtney general. No se quite los puntos de sutura usted mismo. Los puntos en la ella se suelen retirar al cabo de santana semana. Maris los puntos y las grapas en otras zonas del cuerpo, yael en la espalda o el abdomen o sobre santana articulación, podrían tener que permanecer en wilkes lugar por más [place], frecuentemente santana o [place]. Asegúrese de seguir las instrucciones de wilkes médico.  ¿Cómo se retiran los puntos de sutura y las grapas? Generalmente no duele cuando el médico retira los puntos de sutura o las grapas. Usted puede sentir un pequeño tirón cuando se saca cada punto o grapa. · Estará sentado o recostado. · Para retirar los puntos de sutura, el médico utilizará tijeras para cortar cada carlitos de los nudos y [employer] hilos. · Para retirar las grapas, el médico usará un instrumento para sacar las grapas santana por [place]. · La rick puede estar sensible después de lisa retirado los puntos de sutura o las grapas. Maris debería sentirse mejor al cabo de unos minutos o después de algunas horas. ¿Qué puede esperar después de que le retiren los puntos de sutura o las grapas? Dependiendo del tipo de hoang y de wilkes ubicación, usted tendrá santana cicatriz. Las cicatrices por lo general son menos visibles con el tiempo. Mantenga limpia la rick, maris no se necesita santana venda. ¿Cuándo debe pedir ayuda? Llame a wilkes médico ahora mismo o busque atención médica inmediata si:  · Siente un dolor nuevo o el dolor empeora. · Tiene dificultades para  la rick que rodea la cicatriz.   · Tiene síntomas de infección, tales yael:  ¨ Mayor dolor, hinchazón, enrojecimiento o aumento de la temperatura alrededor de la cicatriz. ¨ Vetas rojizas que salen de la cicatriz. ¨ Pus que sale de la cicatriz. Corey Dolphin. Preste especial atención a los cambios en wilkes courtney y asegúrese de comunicarse con wilkes médico si:  · Se le abre la cicatriz. · No mejora yael se esperaba. La atención de seguimiento es santana parte clave de wilkes tratamiento y seguridad. Asegúrese de hacer y acudir a todas las citas, y llame a wilkes médico si está teniendo problemas. También es santana buena idea mantener santana lista de los medicamentos que shira. ¿Dónde puede encontrar más información en inglés? Osei Merritt a http://estefania-jarod.info/. Jaycee Ivan J531 en la búsqueda para aprender más acerca de \"Aprenda acerca de la retirada de puntos de sutura y grapas - [ Learning About Stitches and Staples Removal ]. \"  Revisado: 20 Obey Ingram 2017  Versión del contenido: 11.4  © 7596-8249 [employer]wise, Incorporated. Las instrucciones de cuidado fueron adaptadas bajo licencia por [employer] (which disclaims liability or warranty for this information). Si usted tiene [place] afección médica o sobre estas instrucciones, siempre pregunte a wilkes profesional de courtney. Healthwise, Incorporated niega toda garantía o responsabilidad por wilkes uso de esta información.

## 2018-05-09 NOTE — ED PROVIDER NOTES
HPI Comments: 38 y.o. male with no significant past medical history who presents ambulatory to the ED with cc of suture removal. Pt states he was evaluated here last week with a laceration over his right eyebrow sustained during a work accident when the strap of a Bobcat broke while he was pulling a log. He states had a laceration repair at the time and is back as instructed after 7 days for a suture removal. Pt specifically denies any complications, drainage, fevers, or chills. There are no other acute medical concerns at this time. Social Hx: denies Tobacco use  PCP: None    Note written by Cachorro Ng, as dictated by Lavern Reinoso. Prerna Chen MD 7:15 AM    The history is provided by the patient. No  was used. No past medical history on file. No past surgical history on file. No family history on file. Social History     Social History    Marital status:      Spouse name: N/A    Number of children: N/A    Years of education: N/A     Occupational History    Not on file. Social History Main Topics    Smoking status: Never Smoker    Smokeless tobacco: Not on file    Alcohol use No    Drug use: No    Sexual activity: Not on file     Other Topics Concern    Not on file     Social History Narrative         ALLERGIES: Review of patient's allergies indicates no known allergies. Review of Systems   Constitutional: Negative for chills and fever. HENT: Negative for ear pain and sore throat. Eyes: Negative for pain. Respiratory: Negative for chest tightness and shortness of breath. Cardiovascular: Negative for chest pain and leg swelling. Gastrointestinal: Negative for abdominal pain, nausea and vomiting. Genitourinary: Negative for dysuria and flank pain. Musculoskeletal: Negative for back pain. Skin: Positive for wound (with sutures). Negative for rash. Neurological: Negative for headaches.    All other systems reviewed and are negative. Vitals:    05/09/18 0659   BP: 144/81   Pulse: 71   Resp: 18   Temp: 98.4 °F (36.9 °C)   SpO2: 100%   Weight: 90.7 kg (200 lb)   Height: 5' 9\" (1.753 m)            Physical Exam   Constitutional: He appears well-developed and well-nourished. No distress. HENT:   Head: Normocephalic and atraumatic. Eyes: Pupils are equal, round, and reactive to light. No scleral icterus. Neck: Normal range of motion. Neck supple. No tracheal deviation present. Cardiovascular: Normal rate, regular rhythm, normal heart sounds and intact distal pulses. Exam reveals no gallop and no friction rub. No murmur heard. Pulmonary/Chest: Effort normal and breath sounds normal. No respiratory distress. He has no wheezes. He has no rales. Abdominal: Soft. He exhibits no distension. There is no tenderness. There is no rebound and no guarding. Musculoskeletal: He exhibits no edema. Neurological: He is alert. Skin: Skin is warm, dry and intact. Laceration to R eyebrow with 11 sutures in place; skin intact without purulence or erythema   Psychiatric: He has a normal mood and affect. Nursing note and vitals reviewed. Note written by Cachorro Alonso, as dictated by Tomas Sharif. Gabriel Harris MD 7:15 AM      [facility]      ED Course       Suture/Staple Removal  Date/Time: 5/9/2018 5:32 PM  Performed by: Ladonna Reyez Authorized by: Ladonna Reyez     Consent:     Consent obtained:  Verbal    Consent given by:  Patient    Alternatives discussed:  No treatment  Location:     Location:  [address] [facility] location:  Eyebrow    Eyebrow location:  R eyebrow  Procedure details:     Wound appearance:  No signs of infection, good wound healing and clean    Number of sutures removed:  11  Post-procedure details:     Post-removal:  No dressing applied    Patient tolerance of procedure:   Tolerated well, no immediate complications

## 2025-01-13 ENCOUNTER — HOSPITAL ENCOUNTER (EMERGENCY)
Facility: HOSPITAL | Age: 50
Discharge: HOME | End: 2025-01-13
Attending: EMERGENCY MEDICINE

## 2025-01-13 ENCOUNTER — APPOINTMENT (OUTPATIENT)
Facility: HOSPITAL | Age: 50
End: 2025-01-13

## 2025-01-13 VITALS
SYSTOLIC BLOOD PRESSURE: 128 MMHG | RESPIRATION RATE: 16 BRPM | HEART RATE: 67 BPM | BODY MASS INDEX: 31.1 KG/M2 | TEMPERATURE: 97.9 F | OXYGEN SATURATION: 96 % | WEIGHT: 210 LBS | DIASTOLIC BLOOD PRESSURE: 93 MMHG | HEIGHT: 69 IN

## 2025-01-13 DIAGNOSIS — K40.90 INGUINAL HERNIA, RIGHT: Primary | ICD-10-CM

## 2025-01-13 LAB
ALBUMIN SERPL-MCNC: 3.6 G/DL (ref 3.5–5)
ALBUMIN/GLOB SERPL: 0.9 (ref 1.1–2.2)
ALP SERPL-CCNC: 129 U/L (ref 45–117)
ALT SERPL-CCNC: 29 U/L (ref 12–78)
ANION GAP SERPL CALC-SCNC: 3 MMOL/L (ref 2–12)
AST SERPL-CCNC: 17 U/L (ref 15–37)
BASOPHILS # BLD: 0.08 K/UL (ref 0–0.1)
BASOPHILS NFR BLD: 1.1 % (ref 0–1)
BILIRUB SERPL-MCNC: 0.5 MG/DL (ref 0.2–1)
BUN SERPL-MCNC: 12 MG/DL (ref 6–20)
BUN/CREAT SERPL: 13 (ref 12–20)
CALCIUM SERPL-MCNC: 8.7 MG/DL (ref 8.5–10.1)
CHLORIDE SERPL-SCNC: 108 MMOL/L (ref 97–108)
CO2 SERPL-SCNC: 27 MMOL/L (ref 21–32)
CREAT SERPL-MCNC: 0.92 MG/DL (ref 0.7–1.3)
DIFFERENTIAL METHOD BLD: ABNORMAL
EOSINOPHIL # BLD: 0.54 K/UL (ref 0–0.4)
EOSINOPHIL NFR BLD: 7.1 % (ref 0–7)
ERYTHROCYTE [DISTWIDTH] IN BLOOD BY AUTOMATED COUNT: 13.6 % (ref 11.5–14.5)
GLOBULIN SER CALC-MCNC: 3.9 G/DL (ref 2–4)
GLUCOSE SERPL-MCNC: 109 MG/DL (ref 65–100)
HCT VFR BLD AUTO: 43.5 % (ref 36.6–50.3)
HGB BLD-MCNC: 14.5 G/DL (ref 12.1–17)
IMM GRANULOCYTES # BLD AUTO: 0.02 K/UL (ref 0–0.04)
IMM GRANULOCYTES NFR BLD AUTO: 0.3 % (ref 0–0.5)
LIPASE SERPL-CCNC: 32 U/L (ref 13–75)
LYMPHOCYTES # BLD: 2.81 K/UL (ref 0.8–3.5)
LYMPHOCYTES NFR BLD: 37 % (ref 12–49)
MCH RBC QN AUTO: 27.7 PG (ref 26–34)
MCHC RBC AUTO-ENTMCNC: 33.3 G/DL (ref 30–36.5)
MCV RBC AUTO: 83 FL (ref 80–99)
MONOCYTES # BLD: 0.43 K/UL (ref 0–1)
MONOCYTES NFR BLD: 5.7 % (ref 5–13)
NEUTS SEG # BLD: 3.71 K/UL (ref 1.8–8)
NEUTS SEG NFR BLD: 48.8 % (ref 32–75)
NRBC # BLD: 0 K/UL (ref 0–0.01)
NRBC BLD-RTO: 0 PER 100 WBC
PLATELET # BLD AUTO: 261 K/UL (ref 150–400)
PMV BLD AUTO: 9.9 FL (ref 8.9–12.9)
POTASSIUM SERPL-SCNC: 4 MMOL/L (ref 3.5–5.1)
PROT SERPL-MCNC: 7.5 G/DL (ref 6.4–8.2)
RBC # BLD AUTO: 5.24 M/UL (ref 4.1–5.7)
SODIUM SERPL-SCNC: 138 MMOL/L (ref 136–145)
WBC # BLD AUTO: 7.6 K/UL (ref 4.1–11.1)

## 2025-01-13 PROCEDURE — 83690 ASSAY OF LIPASE: CPT

## 2025-01-13 PROCEDURE — 94761 N-INVAS EAR/PLS OXIMETRY MLT: CPT

## 2025-01-13 PROCEDURE — 74177 CT ABD & PELVIS W/CONTRAST: CPT

## 2025-01-13 PROCEDURE — 6360000004 HC RX CONTRAST MEDICATION: Performed by: EMERGENCY MEDICINE

## 2025-01-13 PROCEDURE — 36415 COLL VENOUS BLD VENIPUNCTURE: CPT

## 2025-01-13 PROCEDURE — 85025 COMPLETE CBC W/AUTO DIFF WBC: CPT

## 2025-01-13 PROCEDURE — 80053 COMPREHEN METABOLIC PANEL: CPT

## 2025-01-13 PROCEDURE — 99285 EMERGENCY DEPT VISIT HI MDM: CPT

## 2025-01-13 RX ORDER — IOPAMIDOL 755 MG/ML
100 INJECTION, SOLUTION INTRAVASCULAR
Status: COMPLETED | OUTPATIENT
Start: 2025-01-13 | End: 2025-01-13

## 2025-01-13 RX ADMIN — IOPAMIDOL 100 ML: 755 INJECTION, SOLUTION INTRAVENOUS at 13:39

## 2025-01-13 ASSESSMENT — PAIN DESCRIPTION - LOCATION: LOCATION: ABDOMEN

## 2025-01-13 ASSESSMENT — ENCOUNTER SYMPTOMS
SORE THROAT: 0
BACK PAIN: 0
RHINORRHEA: 0
SHORTNESS OF BREATH: 0
COUGH: 0
VOMITING: 0
ABDOMINAL PAIN: 1

## 2025-01-13 ASSESSMENT — PAIN DESCRIPTION - DESCRIPTORS: DESCRIPTORS: CRAMPING;DISCOMFORT

## 2025-01-13 ASSESSMENT — PAIN - FUNCTIONAL ASSESSMENT: PAIN_FUNCTIONAL_ASSESSMENT: 0-10

## 2025-01-13 ASSESSMENT — PAIN SCALES - GENERAL: PAINLEVEL_OUTOF10: 9

## 2025-01-13 NOTE — ED PROVIDER NOTES
[facility] EMERGENCY DEPARTMENT  EMERGENCY DEPARTMENT ENCOUNTER      Pt Name: Anup Anderson  MRN: [id number]  Birthdate 1975  Date of evaluation: 1/13/2025  Provider: Agustin Plata MD      HISTORY OF PRESENT ILLNESS      49-year-old  male previously healthy presenting to the ER for an abdominal mass.  Patient reports for about the last 3 months he has had a bulging mass to his right inguinal area.  Mass seems to have been growing in size.  States that it does not ever reduce.  Reports some tingling and pinprick type discomfort.  No history of nausea vomiting fever or or blood in stools.  He has not been seen by physician yet for his symptoms.  History obtained with assistance of               Nursing Notes were reviewed.    REVIEW OF SYSTEMS         Review of Systems   Constitutional:  Negative for fatigue and fever.   HENT:  Negative for rhinorrhea and sore throat.    Respiratory:  Negative for cough and shortness of breath.    Cardiovascular:  Negative for chest pain.   Gastrointestinal:  Positive for abdominal pain. Negative for vomiting.   Musculoskeletal:  Negative for back pain and neck pain.   Skin:  Negative for rash.   Neurological:  Negative for dizziness, weakness and headaches.           PAST MEDICAL HISTORY   No past medical history on file.      SURGICAL HISTORY     No past surgical history on file.      CURRENT MEDICATIONS       Previous Medications    No medications on file       ALLERGIES     Patient has no known allergies.    FAMILY HISTORY     No family history on file.       SOCIAL HISTORY       Social History     Socioeconomic History    Marital status:          PHYSICAL EXAM       ED Triage Vitals [01/13/25 1241]   BP Systolic BP Percentile Diastolic BP Percentile Temp Temp Source Pulse Respirations SpO2   (!) 160/87 -- -- 98.1 °F (36.7 °C) Oral 65 18 96 %      Height Weight - Scale         1.753 m (5' 9\") 95.3 kg (210 lb)

## 2025-01-13 NOTE — ED NOTES
Pt ambulatory upon dc. IV removed. Instructed via  and verbalized understanding to follow up w gen surg, return in case of emergency. Was spoken to by registrar about signing up for insurance.

## 2025-01-13 NOTE — ED TRIAGE NOTES
in use for triage.     Patient ambulatory to ER triage for complaints of hernia on abdomen x 9 months that has worsened recently.     Denies chest pain and SOB.     Dr. Plata in triage to assess patient.